# Patient Record
(demographics unavailable — no encounter records)

---

## 2025-02-26 NOTE — HISTORY OF PRESENT ILLNESS
[Former] : former [< 20 pack-years] : < 20 pack-years [TextBox_4] : 56-year-old female presents for evaluation of productive cough with occasional wheezing for 1 month.  Patient initially had a "cold", associated with nasal congestion and discharge and chest heaviness.  She was treated with OTC meds and oral antibiotics last week.  Presently she denies fever, chills, chest pain, hemoptysis, night sweats or weight loss.  She has a history of GERD without treatment however.  She was diagnosed of SLE at age 19, presently on mycophenolate and decreasing prednisone, down to 7 mg daily.  She has a remote mild smoking history.  She has never been treated with any inhaled respiratory meds. [YearQuit] : 1995 [TextBox_29] : Denies snoring, daytime somnolence, apneic episodes, AM headaches

## 2025-02-26 NOTE — CONSULT LETTER
[Dear  ___] : Dear  [unfilled], [Courtesy Letter:] : I had the pleasure of seeing your patient, [unfilled], in my office today. [Please see my note below.] : Please see my note below. [Sincerely,] : Sincerely, [FreeTextEntry3] : SAMANTHA RAMOS MD  [address], [address] [place] [address]   [DrAbigail  ___] : Dr. VITALE

## 2025-02-26 NOTE — DISCUSSION/SUMMARY
[FreeTextEntry1] : 56-year-old female with postinfectious inflammatory complaints.  I reviewed the PFT results with patient.  Albuterol for occasional use was prescribed.  GERD treatment was also discussed.  Repeat PFTs with lung volumes and diffusion in the future was recommended.  Follow-up with her rheumatologist and PMD as before.

## 2025-02-26 NOTE — CONSULT LETTER
[Dear  ___] : Dear  [unfilled], [Courtesy Letter:] : I had the pleasure of seeing your patient, [unfilled], in my office today. [Please see my note below.] : Please see my note below. [Sincerely,] : Sincerely, [FreeTextEntry3] : SAMANTHA RAMOS MD  [address], [address] [place] [address]   [DrAbigail  ___] : Dr. VITALE